# Patient Record
Sex: MALE | Race: BLACK OR AFRICAN AMERICAN | Employment: FULL TIME | URBAN - METROPOLITAN AREA
[De-identification: names, ages, dates, MRNs, and addresses within clinical notes are randomized per-mention and may not be internally consistent; named-entity substitution may affect disease eponyms.]

---

## 2018-05-15 ENCOUNTER — OFFICE VISIT (OUTPATIENT)
Dept: FAMILY MEDICINE CLINIC | Age: 28
End: 2018-05-15

## 2018-05-15 ENCOUNTER — TELEPHONE (OUTPATIENT)
Dept: FAMILY MEDICINE CLINIC | Age: 28
End: 2018-05-15

## 2018-05-15 VITALS
BODY MASS INDEX: 28.25 KG/M2 | DIASTOLIC BLOOD PRESSURE: 88 MMHG | WEIGHT: 175.8 LBS | TEMPERATURE: 98.7 F | SYSTOLIC BLOOD PRESSURE: 119 MMHG | RESPIRATION RATE: 16 BRPM | HEIGHT: 66 IN | OXYGEN SATURATION: 100 % | HEART RATE: 64 BPM

## 2018-05-15 DIAGNOSIS — N04.9 NEPHROTIC SYNDROME: ICD-10-CM

## 2018-05-15 DIAGNOSIS — R10.9 ACUTE LEFT FLANK PAIN: Primary | ICD-10-CM

## 2018-05-15 DIAGNOSIS — R11.15 NON-INTRACTABLE CYCLICAL VOMITING WITH NAUSEA: ICD-10-CM

## 2018-05-15 LAB
BILIRUB UR QL STRIP: NORMAL
GLUCOSE UR-MCNC: NEGATIVE MG/DL
KETONES P FAST UR STRIP-MCNC: NORMAL MG/DL
PH UR STRIP: 5.5 [PH] (ref 4.6–8)
PROT UR QL STRIP: NORMAL
SP GR UR STRIP: 1.03 (ref 1–1.03)
UA UROBILINOGEN AMB POC: NORMAL (ref 0.2–1)
URINALYSIS CLARITY POC: CLEAR
URINALYSIS COLOR POC: NORMAL
URINE BLOOD POC: NORMAL
URINE LEUKOCYTES POC: NEGATIVE
URINE NITRITES POC: NEGATIVE

## 2018-05-15 RX ORDER — ONDANSETRON 8 MG/1
8 TABLET, ORALLY DISINTEGRATING ORAL
Qty: 20 TAB | Refills: 0 | Status: SHIPPED | OUTPATIENT
Start: 2018-05-15 | End: 2018-07-02 | Stop reason: ALTCHOICE

## 2018-05-15 RX ORDER — FUROSEMIDE 20 MG/1
20 TABLET ORAL 2 TIMES DAILY
COMMUNITY
End: 2019-05-29 | Stop reason: ALTCHOICE

## 2018-05-15 RX ORDER — TRAMADOL HYDROCHLORIDE 50 MG/1
50 TABLET ORAL
Qty: 15 TAB | Refills: 0 | Status: SHIPPED | OUTPATIENT
Start: 2018-05-15 | End: 2018-07-02 | Stop reason: ALTCHOICE

## 2018-05-15 RX ORDER — LISINOPRIL 5 MG/1
TABLET ORAL DAILY
Status: ON HOLD | COMMUNITY
End: 2020-01-16

## 2018-05-15 RX ORDER — DICYCLOMINE HYDROCHLORIDE 20 MG/1
20 TABLET ORAL EVERY 6 HOURS
COMMUNITY
End: 2018-08-14 | Stop reason: ALTCHOICE

## 2018-05-15 RX ORDER — ONDANSETRON 8 MG/1
8 TABLET, ORALLY DISINTEGRATING ORAL
COMMUNITY
End: 2018-05-15 | Stop reason: SDUPTHER

## 2018-05-15 RX ORDER — FAMOTIDINE 20 MG/1
20 TABLET, FILM COATED ORAL 2 TIMES DAILY
COMMUNITY
End: 2018-08-14 | Stop reason: ALTCHOICE

## 2018-05-15 NOTE — PATIENT INSTRUCTIONS
Flank Pain: Care Instructions  Your Care Instructions  Flank pain is pain on the side of the back just below the rib cage and above the waist. It can be on one or both sides. Flank pain has many possible causes, including a kidney stone, a urinary tract infection, or back strain. Flank pain may get better on its own. But don't ignore new symptoms, such as fever, nausea and vomiting, urination problems, pain that gets worse, and dizziness. These may be signs of a more serious problem. You may have to have tests or other treatment. Follow-up care is a key part of your treatment and safety. Be sure to make and go to all appointments, and call your doctor if you are having problems. It's also a good idea to know your test results and keep a list of the medicines you take. How can you care for yourself at home? · Rest until you feel better. · Take pain medicines exactly as directed. ¨ If the doctor gave you a prescription medicine for pain, take it as prescribed. ¨ If you are not taking a prescription pain medicine, ask your doctor if you can take an over-the-counter pain medicine, such as acetaminophen (Tylenol), ibuprofen (Advil, Motrin), or naproxen (Aleve). Read and follow all instructions on the label. · If your doctor prescribed antibiotics, take them as directed. Do not stop taking them just because you feel better. You need to take the full course of antibiotics. · To apply heat, put a warm water bottle, a heating pad set on low, or a warm cloth on the painful area. Do not go to sleep with a heating pad on your skin. · To prevent dehydration, drink plenty of fluids, enough so that your urine is light yellow or clear like water. Choose water and other caffeine-free clear liquids until you feel better. If you have kidney, heart, or liver disease and have to limit fluids, talk with your doctor before you increase the amount of fluids you drink. When should you call for help?   Call your doctor now or seek immediate medical care if:  ? · Your flank pain gets worse. ? · You have new symptoms, such as fever, nausea, or vomiting. ? · You have symptoms of a urinary problem. For example:  ¨ You have blood or pus in your urine. ¨ You have chills or body aches. ¨ It hurts to urinate. ¨ You have groin or belly pain. ? Watch closely for changes in your health, and be sure to contact your doctor if you do not get better as expected. Where can you learn more? Go to http://deb-otis.info/. Enter S191 in the search box to learn more about \"Flank Pain: Care Instructions. \"  Current as of: March 20, 2017  Content Version: 11.4  © 4129-3921 [employer]. Care instructions adapted under license by [employer] (which disclaims liability or warranty for this information). If you have questions about a medical condition or this instruction, always ask your healthcare professional. [place] [address] any warranty or liability for your use of this information. Flank Pain: Care Instructions  Your Care Instructions  Flank pain is pain on the side of the back just below the rib cage and above the waist. It can be on one or both sides. Flank pain has many possible causes, including a kidney stone, a urinary tract infection, or back strain. Flank pain may get better on its own. But don't ignore new symptoms, such as fever, nausea and vomiting, urination problems, pain that gets worse, and dizziness. These may be signs of a more serious problem. You may have to have tests or other treatment. Follow-up care is a key part of your treatment and safety. Be sure to make and go to all appointments, and call your doctor if you are having problems. It's also a good idea to know your test results and keep a list of the medicines you take. How can you care for yourself at home? · Rest until you feel better. · Take pain medicines exactly as directed.   ¨ If the doctor gave you a prescription medicine for pain, take it as prescribed. ¨ If you are not taking a prescription pain medicine, ask your doctor if you can take an over-the-counter pain medicine, such as acetaminophen (Tylenol), ibuprofen (Advil, Motrin), or naproxen (Aleve). Read and follow all instructions on the label. · If your doctor prescribed antibiotics, take them as directed. Do not stop taking them just because you feel better. You need to take the full course of antibiotics. · To apply heat, put a warm water bottle, a heating pad set on low, or a warm cloth on the painful area. Do not go to sleep with a heating pad on your skin. · To prevent dehydration, drink plenty of fluids, enough so that your urine is light yellow or clear like water. Choose water and other caffeine-free clear liquids until you feel better. If you have kidney, heart, or liver disease and have to limit fluids, talk with your doctor before you increase the amount of fluids you drink. When should you call for help? Call your doctor now or seek immediate medical care if:  ? · Your flank pain gets worse. ? · You have new symptoms, such as fever, nausea, or vomiting. ? · You have symptoms of a urinary problem. For example:  ¨ You have blood or pus in your urine. ¨ You have chills or body aches. ¨ It hurts to urinate. ¨ You have groin or belly pain. ? Watch closely for changes in your health, and be sure to contact your doctor if you do not get better as expected. Where can you learn more? Go to http://deb-otis.info/. Enter S191 in the search box to learn more about \"Flank Pain: Care Instructions. \"  Current as of: March 20, 2017  Content Version: 11.4  © 9798-7391 [employer]. Care instructions adapted under license by [employer] (which disclaims liability or warranty for this information).  If you have questions about a medical condition or this instruction, always ask your healthcare professional. [address] any warranty or liability for your use of this information.

## 2018-05-15 NOTE — PROGRESS NOTES
Chief Complaint   Patient presents with    Side Pain     and vomiting x  1 day     1. Have you been to the ER, urgent care clinic since your last visit? Hospitalized since your last visit? Yes When: 5/8/2018 Where: [facility]  Reason for visit: food poisioning    2. Have you seen or consulted any other health care providers outside of the [address] [place] [address] since your last visit? Include any pap smears or colon screening.  Yes When: 2/2018 Where:  [facility] Reason for visit: kidney problems

## 2018-05-15 NOTE — MR AVS SNAPSHOT
Deven Ca [facility] [address] [facility] [address] [facility] [phone] [id number] [phone] Patient: Loretta Bay MRN: [id number] TMM:7/17/8693 Visit Information Date & Time Provider Department Dept. Phone Encounter #  
 5/15/2018 11:30 AM Yaima Montemayor NP [facility] 13 [id number] Follow-up Instructions Return in about 2 days (around 5/17/2018) for flank pain. Upcoming Health Maintenance Date Due DTaP/Tdap/Td series (1 - Tdap) 2/24/2010 Influenza Age 5 to Adult 8/1/2018 Allergies as of 5/15/2018  Review Complete On: 5/15/2018 By: Kermit Fothergill, LPN No Known Allergies Current Immunizations  Never Reviewed No immunizations on file. Not reviewed this visit You Were Diagnosed With   
  
 Codes Comments Acute left flank pain    -  Primary ICD-10-CM: R10.9 ICD-9-CM: 789.09, 338.19 Non-intractable cyclical vomiting with nausea     ICD-10-CM: G43. A0 ICD-9-CM: 970. 2 Vitals BP Pulse Temp Resp Height(growth percentile) Weight(growth percentile) 119/88 (BP 1 Location: Left arm, BP Patient Position: Sitting) 64 98.7 °F (37.1 °C) (Oral) 16 5' 6\" (1.676 m) 175 lb 12.8 oz (79.7 kg) SpO2 BMI Smoking Status 100% 28.37 kg/m2 Current Every Day Smoker Vitals History BMI and BSA Data Body Mass Index Body Surface Area  
 28.37 kg/m 2 1.93 m 2 Preferred Pharmacy Pharmacy Name Phone [phone] Mel[address] Rylie DUDLEY [place] [phone] Your Updated Medication List  
  
   
This list is accurate as of 5/15/18 12:27 PM.  Always use your most recent med list.  
  
  
  
  
 BENTYL 20 mg tablet Generic drug:  dicyclomine Take 20 mg by mouth every six (6) hours. furosemide 20 mg tablet Commonly known as:  LASIX Take 20 mg by mouth two (2) times a day. lisinopril 5 mg tablet Commonly known as:  Valene Pencil  
 Take  by mouth daily. ondansetron 8 mg disintegrating tablet Commonly known as:  ZOFRAN ODT Take 1 Tab by mouth every eight (8) hours as needed for Nausea. PEPCID 20 mg tablet Generic drug:  famotidine Take 20 mg by mouth two (2) times a day. traMADol 50 mg tablet Commonly known as:  ULTRAM  
Take 1 Tab by mouth every eight (8) hours as needed for Pain. Max Daily Amount: 150 mg.  
  
  
  
  
Prescriptions Printed Refills  
 traMADol (ULTRAM) 50 mg tablet 0 Sig: Take 1 Tab by mouth every eight (8) hours as needed for Pain. Max Daily Amount: 150 mg.  
 Class: Print Route: Oral  
  
Prescriptions Sent to Pharmacy Refills  
 ondansetron (ZOFRAN ODT) 8 mg disintegrating tablet 0 Sig: Take 1 Tab by mouth every eight (8) hours as needed for Nausea. Class: Normal  
 Pharmacy: [facility] DR DIANELYS IGLESIAS [address] [place] [address] Ph #: [phone] Route: Oral  
  
We Performed the Following AMB POC URINALYSIS DIP STICK AUTO W/O MICRO [24956 CPT(R)] Follow-up Instructions Return in about 2 days (around 5/17/2018) for flank pain. To-Do List   
 05/15/2018 Imaging:  CT ABD PELV W WO CONT Patient Instructions Flank Pain: Care Instructions Your Care Instructions Flank pain is pain on the side of the back just below the rib cage and above the waist. It can be on one or both sides. Flank pain has many possible causes, including a kidney stone, a urinary tract infection, or back strain. Flank pain may get better on its own. But don't ignore new symptoms, such as fever, nausea and vomiting, urination problems, pain that gets worse, and dizziness. These may be signs of a more serious problem. You may have to have tests or other treatment. Follow-up care is a key part of your treatment and safety.  Be sure to make and go to all appointments, and call your doctor if you are having problems. It's also a good idea to know your test results and keep a list of the medicines you take. How can you care for yourself at home? · Rest until you feel better. · Take pain medicines exactly as directed. ¨ If the doctor gave you a prescription medicine for pain, take it as prescribed. ¨ If you are not taking a prescription pain medicine, ask your doctor if you can take an over-the-counter pain medicine, such as acetaminophen (Tylenol), ibuprofen (Advil, Motrin), or naproxen (Aleve). Read and follow all instructions on the label. · If your doctor prescribed antibiotics, take them as directed. Do not stop taking them just because you feel better. You need to take the full course of antibiotics. · To apply heat, put a warm water bottle, a heating pad set on low, or a warm cloth on the painful area. Do not go to sleep with a heating pad on your skin. · To prevent dehydration, drink plenty of fluids, enough so that your urine is light yellow or clear like water. Choose water and other caffeine-free clear liquids until you feel better. If you have kidney, heart, or liver disease and have to limit fluids, talk with your doctor before you increase the amount of fluids you drink. When should you call for help? Call your doctor now or seek immediate medical care if: 
? · Your flank pain gets worse. ? · You have new symptoms, such as fever, nausea, or vomiting. ? · You have symptoms of a urinary problem. For example: ¨ You have blood or pus in your urine. ¨ You have chills or body aches. ¨ It hurts to urinate. ¨ You have groin or belly pain. ? Watch closely for changes in your health, and be sure to contact your doctor if you do not get better as expected. Where can you learn more? Go to http://deb-otis.info/. Enter S191 in the search box to learn more about \"Flank Pain: Care Instructions. \" Current as of: March 20, 2017 Content Version: 11.4 © 6249-0749 Tap[employer]. Care instructions adapted under license by [employer] (which disclaims liability or warranty for this information). If you have questions about a medical condition or this instruction, always ask your healthcare professional. [address] any warranty or liability for your use of this information. Flank Pain: Care Instructions Your Care Instructions Flank pain is pain on the side of the back just below the rib cage and above the waist. It can be on one or both sides. Flank pain has many possible causes, including a kidney stone, a urinary tract infection, or back strain. Flank pain may get better on its own. But don't ignore new symptoms, such as fever, nausea and vomiting, urination problems, pain that gets worse, and dizziness. These may be signs of a more serious problem. You may have to have tests or other treatment. Follow-up care is a key part of your treatment and safety. Be sure to make and go to all appointments, and call your doctor if you are having problems. It's also a good idea to know your test results and keep a list of the medicines you take. How can you care for yourself at home? · Rest until you feel better. · Take pain medicines exactly as directed. ¨ If the doctor gave you a prescription medicine for pain, take it as prescribed. ¨ If you are not taking a prescription pain medicine, ask your doctor if you can take an over-the-counter pain medicine, such as acetaminophen (Tylenol), ibuprofen (Advil, Motrin), or naproxen (Aleve). Read and follow all instructions on the label. · If your doctor prescribed antibiotics, take them as directed. Do not stop taking them just because you feel better. You need to take the full course of antibiotics. · To apply heat, put a warm water bottle, a heating pad set on low, or a warm cloth on the painful area. Do not go to sleep with a heating pad on your skin. · To prevent dehydration, drink plenty of fluids, enough so that your urine is light yellow or clear like water. Choose water and other caffeine-free clear liquids until you feel better. If you have kidney, heart, or liver disease and have to limit fluids, talk with your doctor before you increase the amount of fluids you drink. When should you call for help? Call your doctor now or seek immediate medical care if: 
? · Your flank pain gets worse. ? · You have new symptoms, such as fever, nausea, or vomiting. ? · You have symptoms of a urinary problem. For example: ¨ You have blood or pus in your urine. ¨ You have chills or body aches. ¨ It hurts to urinate. ¨ You have groin or belly pain. ? Watch closely for changes in your health, and be sure to contact your doctor if you do not get better as expected. Where can you learn more? Go to http://deb-otis.info/. Enter S191 in the search box to learn more about \"Flank Pain: Care Instructions. \" Current as of: March 20, 2017 Content Version: 11.4 © 9226-1502 Oyokey. Care instructions adapted under license by [employer] (which disclaims liability or warranty for this information). If you have questions about a medical condition or this instruction, always ask your healthcare professional. [address] any warranty or liability for your use of this information. Introducing [facility]! [employer] introduces [employer] patient portal. Now you can access parts of your medical record, email your doctor's office, and request medication refills online. 1. In your internet browser, go to [email or web address] [place] 2. Click on the First Time User? Click Here link in the Sign In box. You will see the New Member Sign Up page. 3. Enter your [facility] [facility] Access Code exactly as it appears below.  You will not need to use this code after youve completed the sign-up process. If you do not sign up before the expiration date, you must request a new code. · siXis Access Code: [email or web address] Expires: 8/13/2018 12:27 PM 
 
4. Enter the last four digits of your Social Security Number (xxxx) and Date of Birth (mm/dd/yyyy) as indicated and click Submit. You will be taken to the next sign-up page. 5. Create a siXis ID. This will be your siXis login ID and cannot be changed, so think of one that is secure and easy to remember. 6. Create a siXis password. You can change your password at any time. 7. Enter your Password Reset Question and Answer. This can be used at a later time if you forget your password. 8. Enter your e-mail address. You will receive e-mail notification when new information is available in [address]. 9. Click Sign Up. You can now view and download portions of your medical record. 10. Click the Download Summary menu link to download a portable copy of your medical information. If you have questions, please visit the Frequently Asked Questions section of the siXis website. Remember, siXis is NOT to be used for urgent needs. For medical emergencies, dial 911. Now available from your iPhone and Android! Please provide this summary of care documentation to your next provider. If you have any questions after today's visit, please call [phone].

## 2018-05-15 NOTE — TELEPHONE ENCOUNTER
Gillian Aguirre called and wanted to know what the provider was looking for, for the CT. She states the order does not specify and they need to know so they can know what how to run the CT. After speaking with provider Sahil Greene NP, she is looking for hydronephrosis.

## 2018-05-15 NOTE — PROGRESS NOTES
Chief Complaint   Patient presents with    Side Pain     and vomiting x  1 day       HPI:    This is a  32 y/o  male patient who presents in copy of his mother for left flank pain, nausea and vomiting for one day. Patient confirm he was seen at the ED on 5/8/18 for food poisoning after eating hamburger. He was treated and discharged home and have been feeling better since up until yesterday when he started to vomit with left flank pain. Describes pain as a sharp pain and rates it 10/10. No aggravating or relieving factor. Have been vomiting every hour and unable to keep food down. Patient states he is concerned about his Kidney- he was seen at [facility] ED in November for lower extremity swelling. He was diagnosed with nephrotic syndrome and started on lasix. Recent ED visit labs reviewed:  Kidney function normal      Results for orders placed or performed in visit on 05/15/18   AMB POC URINALYSIS DIP STICK AUTO W/O MICRO   Result Value Ref Range    Color (UA POC) Dark Danay     Clarity (UA POC) Clear     Glucose (UA POC) Negative Negative    Bilirubin (UA POC) 1+ Negative    Ketones (UA POC) Trace Negative    Specific gravity (UA POC) 1.030 1.001 - 1.035    Blood (UA POC) 3+ Negative    pH (UA POC) 5.5 4.6 - 8.0    Protein (UA POC) 3+ Negative    Urobilinogen (UA POC) 1 mg/dL 0.2 - 1    Nitrites (UA POC) Negative Negative    Leukocyte esterase (UA POC) Negative Negative         ROS: Pt denies: Wt loss, Fever/Chills, HA, Visual changes, Fatigue, Chest pain, SOB, GONSALVES, D/C, Blood in stool or urine   Pertinent positive as above in HPI. All others were negative          Past Medical History:   Diagnosis Date    Chronic kidney disease        History reviewed. No pertinent surgical history.        Social History     Social History    Marital status: UNKNOWN     Spouse name: N/A    Number of children: N/A    Years of education: N/A     Social History Main Topics    Smoking status: Current Every Day Smoker     Packs/day: 1.00    Smokeless tobacco: Never Used    Alcohol use Yes      Comment: 2 beers a month    Drug use: No    Sexual activity: No     Other Topics Concern    None     Social History Narrative    None       Current Outpatient Prescriptions   Medication Sig Dispense Refill    dicyclomine (BENTYL) 20 mg tablet Take 20 mg by mouth every six (6) hours.  furosemide (LASIX) 20 mg tablet Take 20 mg by mouth two (2) times a day.  famotidine (PEPCID) 20 mg tablet Take 20 mg by mouth two (2) times a day.  lisinopril (PRINIVIL, ZESTRIL) 5 mg tablet Take  by mouth daily.  traMADol (ULTRAM) 50 mg tablet Take 1 Tab by mouth every eight (8) hours as needed for Pain. Max Daily Amount: 150 mg. 15 Tab 0    ondansetron (ZOFRAN ODT) 8 mg disintegrating tablet Take 1 Tab by mouth every eight (8) hours as needed for Nausea. 20 Tab 0       No Known Allergies          Physical Exam:    Vital Signs:   Visit Vitals    /88 (BP 1 Location: Left arm, BP Patient Position: Sitting)    Pulse 64    Temp 98.7 °F (37.1 °C) (Oral)    Resp 16    Ht 5' 6\" (1.676 m)    Wt 175 lb 12.8 oz (79.7 kg)    SpO2 100%    BMI 28.37 kg/m2         General: a, a & o x 3, afebrile, interacting appropriately, in no acute distress  HEENT: head normocephalic and atraumatic, conjuctiva clear  Skin: warm and dry, no rashes , no bruises, no skin lesions  Neck: supple, symmetrical, no palpable mass, no thyromegaly  Respiratory: lung sounds clear bilaterally, , no wheezes or crackles  Cardiovascular: normal S1S2, regular rate and rhythm, no murmurs  Abdomen: non-distended, normal bowel sounds x 4  Musculoskeletal: normal ROM on all joints, no swelling or deformity  Psychiatric: a, a & o x 3, appropriate mood and affect, no thought disorder  Extremities: 2+ pitting edema to BLE        Assessment/Plan:      ICD-10-CM ICD-9-CM    1.  Acute left flank pain R10.9 789.09 traMADol (ULTRAM) 50 mg tablet     338.19 AMB POC URINALYSIS DIP STICK AUTO W/O MICRO-  3+  Proetin      CT ABD PELV W WO CONT      CANCELED: CT ABD PELV W WO CONT   2. Non-intractable cyclical vomiting with nausea G43. A0 536.2 ondansetron (ZOFRAN ODT) 8 mg disintegrating tablet           Additional Notes: Discussed today's diagnosis, treatment plans. Discussed medication indications and side effects. After Visit Summary: Provided and discussed printed patient instructions. Answered questions in relation to today's diagnosis.   Follow-up Disposition: 2 days f/u            Bj Perez NP-BC  Family Medicine  [facility]              Orders Placed This Encounter    CT ABD PELV W WO CONT    AMB POC URINALYSIS DIP STICK AUTO W/O MICRO    dicyclomine (BENTYL) 20 mg tablet    furosemide (LASIX) 20 mg tablet    famotidine (PEPCID) 20 mg tablet    lisinopril (PRINIVIL, ZESTRIL) 5 mg tablet    DISCONTD: ondansetron (ZOFRAN ODT) 8 mg disintegrating tablet    traMADol (ULTRAM) 50 mg tablet    ondansetron (ZOFRAN ODT) 8 mg disintegrating tablet

## 2018-05-17 ENCOUNTER — OFFICE VISIT (OUTPATIENT)
Dept: FAMILY MEDICINE CLINIC | Age: 28
End: 2018-05-17

## 2018-05-17 VITALS
WEIGHT: 175 LBS | DIASTOLIC BLOOD PRESSURE: 76 MMHG | BODY MASS INDEX: 28.12 KG/M2 | TEMPERATURE: 98.2 F | HEART RATE: 65 BPM | HEIGHT: 66 IN | RESPIRATION RATE: 16 BRPM | SYSTOLIC BLOOD PRESSURE: 120 MMHG

## 2018-05-17 DIAGNOSIS — R10.9 FLANK PAIN: ICD-10-CM

## 2018-05-17 DIAGNOSIS — N05.9 GLOMERULONEPHRITIS: ICD-10-CM

## 2018-05-17 DIAGNOSIS — K52.9 GASTROENTERITIS: Primary | ICD-10-CM

## 2018-05-17 NOTE — PROGRESS NOTES
Patient stated that Bentyl made him vomit and he stopped medication. Also patient stated he was not taking the Lisinopril dur to it made him have a headache.

## 2018-05-17 NOTE — PATIENT INSTRUCTIONS
Glomerulonephritis: Care Instructions  Your Care Instructions    Glomerulonephritis (say \"ngvx-vbkz-und-bow-ugr-UWY-tus\") is inflammation of the part of the kidney that filters blood. This part is called the glomerulus. Kidney problems can cause swelling in the face, belly, arms, hands, legs, or feet. This problem can be caused by an infection or some medicines. It can also be caused by diseases such as diabetes or lupus. Sometimes the cause is not known. This illness may get better with treatment. But it often leads to long-term (chronic) kidney disease. Treatment may include:  · Corticosteroid medicines. These reduce inflammation. · Immunosuppressive medicines. These reduce inflammation. · One or more medicines to lower your blood pressure. · Dialysis, in some cases. In this treatment, a machine does the work of the kidneys to clean wastes from the blood. Follow-up care is a key part of your treatment and safety. Be sure to make and go to all appointments, and call your doctor if you are having problems. It's also a good idea to know your test results and keep a list of the medicines you take. How can you care for yourself at home? · Take your medicines exactly as prescribed. Call your doctor if you have any problems with your medicine. You will get more details on the specific medicines your doctor prescribes. · Talk to a registered dietitian to help you make a meal plan that is right for you. Most people with chronic kidney disease need to limit salt (sodium), fluids, and protein. Some also have to limit potassium and phosphorus. · Do not take anti-inflammatory medicines such as ibuprofen (Advil, Motrin) and naproxen (Aleve). They can make kidney problems worse. It is okay to take acetaminophen (Tylenol). · Seek support from family, friends, and a counselor if you need it. Long-term illnesses can be difficult and stressful. When should you call for help?   Call 911 anytime you think you may need emergency care. For example, call if:  ? · You passed out (lost consciousness). ?Call your doctor now or seek immediate medical care if:  ? · You have new or worse nausea and vomiting. ? · You have much less urine than normal, or you have no urine. ? · You are feeling confused or cannot think clearly. ? · You have new or more blood in your urine. ? · You have new swelling. ? · You are dizzy or lightheaded, or you feel like you may faint. ? Watch closely for changes in your health, and be sure to contact your doctor if:  ? · You do not get better as expected. Where can you learn more? Go to http://deb-otis.info/. Enter H062 in the search box to learn more about \"Glomerulonephritis: Care Instructions. \"  Current as of: May 12, 2017  Content Version: 11.4  © 3034-0429 [facility]. Care instructions adapted under license by [employer] (which disclaims liability or warranty for this information). If you have questions about a medical condition or this instruction, always ask your healthcare professional. [address] any warranty or liability for your use of this information.

## 2018-05-17 NOTE — MR AVS SNAPSHOT
Deven James [place] [address] [facility] [address] [facility] 83 [address] 
[phone] Patient: Nighat Cuevas MRN: [id number] VJS:7/87/[id number] Visit Information Date & Time Provider Department Dept. Phone Encounter #  
 5/17/2018  7:30 AM Cecily Lee, [address] [place], [place] [id number] Follow-up Instructions Return if symptoms worsen or fail to improve. Upcoming Health Maintenance Date Due Pneumococcal 19-64 Highest Risk (1 of 3 - PCV13) 2/24/2008 DTaP/Tdap/Td series (1 - Tdap) 2/24/2010 Influenza Age 5 to Adult 8/1/2018 Allergies as of 5/17/2018  Review Complete On: 5/17/2018 By: Dakota Brink LPN No Known Allergies Current Immunizations  Never Reviewed No immunizations on file. Not reviewed this visit You Were Diagnosed With   
  
 Codes Comments Gastroenteritis    -  Primary ICD-10-CM: K52.9 ICD-9-CM: 558. 9 Flank pain     ICD-10-CM: R10.9 ICD-9-CM: 789.09 Vitals BP Pulse Temp Resp Height(growth percentile) Weight(growth percentile) 120/76 65 98.2 °F (36.8 °C) (Oral) 16 5' 6\" (1.676 m) 175 lb (79.4 kg) BMI Smoking Status 28.25 kg/m2 Current Every Day Smoker Vitals History BMI and BSA Data Body Mass Index Body Surface Area  
 28.25 kg/m 2 1.92 m 2 Preferred Pharmacy Pharmacy Name Phone [facility] [address] [place] [address] [phone] Your Updated Medication List  
  
   
This list is accurate as of 5/17/18  8:09 AM.  Always use your most recent med list.  
  
  
  
  
 BENTYL 20 mg tablet Generic drug:  dicyclomine Take 20 mg by mouth every six (6) hours. furosemide 20 mg tablet Commonly known as:  LASIX Take 20 mg by mouth two (2) times a day. lisinopril 5 mg tablet Commonly known as:  Maza [place] Take  by mouth daily. ondansetron 8 mg disintegrating tablet Commonly known as:  ZOFRAN ODT Take 1 Tab by mouth every eight (8) hours as needed for Nausea. PEPCID 20 mg tablet Generic drug:  famotidine Take 20 mg by mouth two (2) times a day. traMADol 50 mg tablet Commonly known as:  ULTRAM  
Take 1 Tab by mouth every eight (8) hours as needed for Pain. Max Daily Amount: 150 mg. Follow-up Instructions Return if symptoms worsen or fail to improve. Patient Instructions Glomerulonephritis: Care Instructions Your Care Instructions Glomerulonephritis (say \"nmew-baiz-lrb-cse-xqc-NSG-tus\") is inflammation of the part of the kidney that filters blood. This part is called the glomerulus. Kidney problems can cause swelling in the face, belly, arms, hands, legs, or feet. This problem can be caused by an infection or some medicines. It can also be caused by diseases such as diabetes or lupus. Sometimes the cause is not known. This illness may get better with treatment. But it often leads to long-term (chronic) kidney disease. Treatment may include: · Corticosteroid medicines. These reduce inflammation. · Immunosuppressive medicines. These reduce inflammation. · One or more medicines to lower your blood pressure. · Dialysis, in some cases. In this treatment, a machine does the work of the kidneys to clean wastes from the blood. Follow-up care is a key part of your treatment and safety. Be sure to make and go to all appointments, and call your doctor if you are having problems. It's also a good idea to know your test results and keep a list of the medicines you take. How can you care for yourself at home? · Take your medicines exactly as prescribed. Call your doctor if you have any problems with your medicine. You will get more details on the specific medicines your doctor prescribes.  
· Talk to a registered dietitian to help you make a meal plan that is right for you. Most people with chronic kidney disease need to limit salt (sodium), fluids, and protein. Some also have to limit potassium and phosphorus. · Do not take anti-inflammatory medicines such as ibuprofen (Advil, Motrin) and naproxen (Aleve). They can make kidney problems worse. It is okay to take acetaminophen (Tylenol). · Seek support from family, friends, and a counselor if you need it. Long-term illnesses can be difficult and stressful. When should you call for help? Call 911 anytime you think you may need emergency care. For example, call if: 
? · You passed out (lost consciousness). ?Call your doctor now or seek immediate medical care if: 
? · You have new or worse nausea and vomiting. ? · You have much less urine than normal, or you have no urine. ? · You are feeling confused or cannot think clearly. ? · You have new or more blood in your urine. ? · You have new swelling. ? · You are dizzy or lightheaded, or you feel like you may faint. ? Watch closely for changes in your health, and be sure to contact your doctor if: 
? · You do not get better as expected. Where can you learn more? Go to http://deb-otis.info/. Enter X827 in the search box to learn more about \"Glomerulonephritis: Care Instructions. \" Current as of: May 12, 2017 Content Version: 11.4 © 2462-1001 [employer]. Care instructions adapted under license by [employer] (which disclaims liability or warranty for this information). If you have questions about a medical condition or this instruction, always ask your healthcare professional. [address] any warranty or liability for your use of this information. Introducing [facility] & HEALTH SERVICES! Wilmer Hdz introduces [employer] patient portal. Now you can access parts of your medical record, email your doctor's office, and request medication refills online.    
 
1. In your internet browser, go to [email or web address] 2. Click on the First Time User? Click Here link in the Sign In box. You will see the New Member Sign Up page. 3. Enter your [employer] Access Code exactly as it appears below. You will not need to use this code after youve completed the sign-up process. If you do not sign up before the expiration date, you must request a new code. · [employer] Access Code: [email or web address] Expires: 8/13/2018 12:27 PM 
 
4. Enter the last four digits of your Social Security Number (xxxx) and Date of Birth (mm/dd/yyyy) as indicated and click Submit. You will be taken to the next sign-up page. 5. Create a AlmondNett ID. This will be your [employer] login ID and cannot be changed, so think of one that is secure and easy to remember. 6. Create a [employer] password. You can change your password at any time. 7. Enter your Password Reset Question and Answer. This can be used at a later time if you forget your password. 8. Enter your e-mail address. You will receive e-mail notification when new information is available in [address] 9. Click Sign Up. You can now view and download portions of your medical record. 10. Click the Download Summary menu link to download a portable copy of your medical information. If you have questions, please visit the Frequently Asked Questions section of the [employer] website. Remember, [employer] is NOT to be used for urgent needs. For medical emergencies, dial 911. Now available from your iPhone and Android! Please provide this summary of care documentation to your next provider. If you have any questions after today's visit, please call [phone].

## 2018-05-17 NOTE — PROGRESS NOTES
Chief Complaint   Patient presents with    Follow-up     2 day for flank pain       HPI:    This is a  32 y/o  male patient who presents for left flank pain, N/V and  Review of CT. Patient states he feel much better- flank pain, N/V resolved  Bilateral leg swelling improved- have been taking lasix as prescribed. He confirm he has appointment with nephrology next month- he is unable to recall name. Recent CT abd/pelv reviewed with patient- unremarkable. IMPRESSION: 1. Small volume of free fluid in the pelvis probably related to nephrotic syndrome. 2. Unremarkable kidneys with no evidence for suspicious calcifications. Ureters are nondilated. Bladder distended normally. 3. No evidence for hematoma of the retroperitoneum post renal biopsy since 2/2/2018. Or      ROS: Pertinent positive as above in HPI. All others were negative          Past Medical History:   Diagnosis Date    Chronic kidney disease          Social History     Social History    Marital status: UNKNOWN     Spouse name: N/A    Number of children: N/A    Years of education: N/A     Social History Main Topics    Smoking status: Current Every Day Smoker     Packs/day: 1.00    Smokeless tobacco: Never Used    Alcohol use Yes      Comment: 2 beers a month    Drug use: No    Sexual activity: No     Other Topics Concern    None     Social History Narrative       Current Outpatient Prescriptions   Medication Sig Dispense Refill    furosemide (LASIX) 20 mg tablet Take 20 mg by mouth two (2) times a day.  famotidine (PEPCID) 20 mg tablet Take 20 mg by mouth two (2) times a day.  traMADol (ULTRAM) 50 mg tablet Take 1 Tab by mouth every eight (8) hours as needed for Pain. Max Daily Amount: 150 mg. 15 Tab 0    ondansetron (ZOFRAN ODT) 8 mg disintegrating tablet Take 1 Tab by mouth every eight (8) hours as needed for Nausea. 20 Tab 0    dicyclomine (BENTYL) 20 mg tablet Take 20 mg by mouth every six (6) hours.       lisinopril (PRINIVIL, ZESTRIL) 5 mg tablet Take  by mouth daily. No Known Allergies          Physical Exam:    Vital Signs:   Visit Vitals    /76    Pulse 65    Temp 98.2 °F (36.8 °C) (Oral)    Resp 16    Ht 5' 6\" (1.676 m)    Wt 175 lb (79.4 kg)    BMI 28.25 kg/m2         General: a, a & o x 3, afebrile, interacting appropriately, in no acute distress  HEENT: head normocephalic and atraumatic, conjuctiva clear  Respiratory: lung sounds clear bilaterally, , no wheezes or crackles  Cardiovascular: normal S1S2, regular rate and rhythm, no murmurs  Abdomen: non-distended, normal bowel sounds x 4  Extremities: 1+ pitting edema to BLE        Assessment/Plan:        ICD-10-CM ICD-9-CM    1. Gastroenteritis K52.9 558.9 Resolved   2. Flank pain R10.9 789.09 Resolved   3. Glomerulonephritis N05.9 583.9 Follow by nephrology  Patient advised to keep appointment next month  Avoid NSAID  Take medications as prescribed           Additional Notes: Discussed today's diagnosis, treatment plans. Discussed medication indications and side effects. After Visit Summary: Provided and discussed printed patient instructions. Answered questions in relation to today's diagnosis.   Follow-up Disposition:  As needed            HUSAM Kingston  [address] [place] [address],[address]

## 2018-07-02 ENCOUNTER — OFFICE VISIT (OUTPATIENT)
Dept: FAMILY MEDICINE CLINIC | Age: 28
End: 2018-07-02

## 2018-07-02 VITALS
BODY MASS INDEX: 29.89 KG/M2 | SYSTOLIC BLOOD PRESSURE: 109 MMHG | RESPIRATION RATE: 18 BRPM | HEIGHT: 66 IN | DIASTOLIC BLOOD PRESSURE: 69 MMHG | TEMPERATURE: 98.3 F | HEART RATE: 79 BPM | WEIGHT: 186 LBS | OXYGEN SATURATION: 98 %

## 2018-07-02 DIAGNOSIS — Z00.00 ROUTINE GENERAL MEDICAL EXAMINATION AT A HEALTH CARE FACILITY: Primary | ICD-10-CM

## 2018-07-02 NOTE — PROGRESS NOTES
1. Have you been to the ER, urgent care clinic since your last visit? Hospitalized since your last visit? No.     2. Have you seen or consulted any other health care providers outside of the [address] [place] [address] since your last visit? Include any pap smears or colon screening. Yes, saw his Nephrologist in 2/2018.      Chief Complaint   Patient presents with    Complete Physical

## 2018-07-02 NOTE — MR AVS SNAPSHOT
Deven Ca [facility] [address] [facility] [address] [facility] 83 [address] 
[phone] Patient: Shelley Ruby MRN: [id number]:[id number]/[id number] Visit Information Date & Time Provider Department Dept. Phone Encounter #  
 7/2/2018 12:30 PM Toi Lopez [id number] Follow-up Instructions Return in about 1 year (around 7/2/2019) for Routine physical.  
  
Upcoming Health Maintenance Date Due Pneumococcal 19-64 Highest Risk (1 of 3 - PCV13) 5/16/2019* DTaP/Tdap/Td series (1 - Tdap) 5/16/2019* Influenza Age 5 to Adult 8/1/2018 *Topic was postponed. The date shown is not the original due date. Allergies as of 7/2/2018  Review Complete On: 7/2/2018 By: Hipolito Quinn No Known Allergies Current Immunizations  Never Reviewed No immunizations on file. Not reviewed this visit You Were Diagnosed With   
  
 Codes Comments Routine general medical examination at a health care facility    -  Primary ICD-10-CM: Z00.00 ICD-9-CM: V70.0 Vitals BP Pulse Temp Resp Height(growth percentile) Weight(growth percentile) 109/69 79 98.3 °F (36.8 °C) (Oral) 18 5' 6\" (1.676 m) 186 lb (84.4 kg) SpO2 BMI Smoking Status 98% 30.02 kg/m2 Current Every Day Smoker Vitals History BMI and BSA Data Body Mass Index Body Surface Area 30.02 kg/m 2 1.98 m 2 Preferred Pharmacy Pharmacy Name Phone [address] [facility] [phone] Your Updated Medication List  
  
   
This list is accurate as of 7/2/18  1:00 PM.  Always use your most recent med list.  
  
  
  
  
 BENTYL 20 mg tablet Generic drug:  dicyclomine Take 20 mg by mouth every six (6) hours. furosemide 20 mg tablet Commonly known as:  LASIX Take 20 mg by mouth two (2) times a day. lisinopril 5 mg tablet Commonly known as:  Thersia Kubas Take  by mouth daily. PEPCID 20 mg tablet Generic drug:  famotidine Take 20 mg by mouth two (2) times a day. Follow-up Instructions Return in about 1 year (around 7/2/2019) for Routine physical.  
  
  
Introducing [facility] & [facility]! Laurel Feliz introduces [employer] patient portal. Now you can access parts of your medical record, email your doctor's office, and request medication refills online. 1. In your internet browser, go to [email or web address] 2. Click on the First Time User? Click Here link in the Sign In box. You will see the New Member Sign Up page. 3. Enter your [employer] Access Code exactly as it appears below. You will not need to use this code after youve completed the sign-up process. If you do not sign up before the expiration date, you must request a new code. · [employer] Access Code: [email or web address] Expires: 8/13/2018 12:27 PM 
 
4. Enter the last four digits of your Social Security Number (xxxx) and Date of Birth (mm/dd/yyyy) as indicated and click Submit. You will be taken to the next sign-up page. 5. Create a [employer] ID. This will be your [employer] login ID and cannot be changed, so think of one that is secure and easy to remember. 6. Create a [employer] password. You can change your password at any time. 7. Enter your Password Reset Question and Answer. This can be used at a later time if you forget your password. 8. Enter your e-mail address. You will receive e-mail notification when new information is available in [address] 9. Click Sign Up. You can now view and download portions of your medical record. 10. Click the Download Summary menu link to download a portable copy of your medical information. If you have questions, please visit the Frequently Asked Questions section of the [employer] website. Remember, [employer] is NOT to be used for urgent needs. For medical emergencies, dial 911. Now available from your iPhone and Android! Please provide this summary of care documentation to your next provider. If you have any questions after today's visit, please call [phone].

## 2018-07-02 NOTE — PROGRESS NOTES
Chief Complaint   Patient presents with    Complete Physical     HPI:    This is a 32 y/o male  patient who presents for complete annual physical.he wants pertinent labs and screening test for age     see nephrology tomorrow    Last seen a long time     had lab done today     recently see at [address] [facility]- 2 weeks ago- leg swollen    Missed 4 appt      [place]    [place] [facility]    ROS: Pt denies: Wt loss, Fever/Chills, HA, Visual changes, Fatigue, Chest pain, SOB, GONSALVES, Abd pain, N/V/D/C, Blood in stool or urine, Edema. Pertinent positive as above in HPI. All others were negative          Past Medical History:   Diagnosis Date    Chronic kidney disease      Social History     Social History    Marital status: UNKNOWN     Spouse name: N/A    Number of children: N/A    Years of education: N/A     Occupational History    Not on file. Social History Main Topics    Smoking status: Current Every Day Smoker     Packs/day: 1.00    Smokeless tobacco: Never Used    Alcohol use Yes      Comment: 2 beers a month    Drug use: No    Sexual activity: No     Other Topics Concern    Not on file     Social History Narrative     Current Outpatient Prescriptions   Medication Sig Dispense Refill    furosemide (LASIX) 20 mg tablet Take 20 mg by mouth two (2) times a day.  dicyclomine (BENTYL) 20 mg tablet Take 20 mg by mouth every six (6) hours.  famotidine (PEPCID) 20 mg tablet Take 20 mg by mouth two (2) times a day.  lisinopril (PRINIVIL, ZESTRIL) 5 mg tablet Take  by mouth daily.  traMADol (ULTRAM) 50 mg tablet Take 1 Tab by mouth every eight (8) hours as needed for Pain. Max Daily Amount: 150 mg. 15 Tab 0    ondansetron (ZOFRAN ODT) 8 mg disintegrating tablet Take 1 Tab by mouth every eight (8) hours as needed for Nausea.  20 Tab 0     No Known Allergies    Physical Exam:    Visit Vitals    /69    Pulse 79    Temp 98.3 °F (36.8 °C) (Oral)    Resp 18    Ht 5' 6\" (1.676 m)    Wt 186 lb (84.4 kg)    SpO2 98%    BMI 30.02 kg/m2         General: a, a & o x 3, afebrile, well-nourished, interacting appropriately, in no acute distress  HEENT: head normocephalic and atraumatic, conjuctiva clear  Skin: warm and dry, no rashes , no bruises, no skin lesions  Neck: supple, symmetrical, no palpable mass, no thyromegaly, no carotid bruits, no JVD  Respiratory: lung sounds clear bilaterally, good air entry, good respiratory effort, no wheezes or crackles  Cardiovascular: normal S1S2, regular rate and rhythm, no murmurs  Abdomen: non-distended, normal bowel sounds x 4 quadrants, soft, non-tender to palpation  Musculoskeletal: normal ROM on all joints, no swelling or deformity  Psychiatric: a, a & o x 3, appropriate mood and affect, no thought disorder    Assessment/Plan:        ICD-10-CM ICD-9-CM    1. Routine general medical examination at a health care facility Z00.00 V70.0            Additional Notes: Discussed today's diagnosis, treatment plans. Discussed medication indications and side effects. After Visit Summary: Provided and discussed printed patient instructions. Answered questions in relation to today's diagnosis. Follow-up Disposition:        Elias Galloway, NP-BC  [address] [facility]        No orders of the defined types were placed in this encounter.

## 2018-08-14 ENCOUNTER — OFFICE VISIT (OUTPATIENT)
Dept: FAMILY MEDICINE CLINIC | Age: 28
End: 2018-08-14

## 2018-08-14 VITALS
SYSTOLIC BLOOD PRESSURE: 123 MMHG | HEIGHT: 66 IN | HEART RATE: 73 BPM | RESPIRATION RATE: 14 BRPM | OXYGEN SATURATION: 97 % | WEIGHT: 204.4 LBS | BODY MASS INDEX: 32.85 KG/M2 | TEMPERATURE: 97.2 F | DIASTOLIC BLOOD PRESSURE: 81 MMHG

## 2018-08-14 DIAGNOSIS — N02.2 MEMBRANOUS NEPHROPATHY DETERMINED BY BIOPSY: ICD-10-CM

## 2018-08-14 RX ORDER — ATORVASTATIN CALCIUM 20 MG/1
20 TABLET, FILM COATED ORAL DAILY
Qty: 1 TAB | Refills: 0
Start: 2018-08-14

## 2018-08-14 RX ORDER — ERGOCALCIFEROL 1.25 MG/1
50000 CAPSULE ORAL
COMMUNITY
Start: 2018-02-09

## 2018-08-14 NOTE — PROGRESS NOTES
Chief Complaint   Patient presents with   [address] [place] [address]     1. Have you been to the ER, urgent care clinic since your last visit? Hospitalized since your last visit? No    2. Have you seen or consulted any other health care providers outside of the [facility] since your last visit? Include any pap smears or colon screening. Yes When: Seen Nephrologist 2 weeks ago.     PHQ over the last two weeks 8/14/2018   Little interest or pleasure in doing things Not at all   Feeling down, depressed, irritable, or hopeless Not at all   Total Score PHQ 2 0     Visit Vitals    /81 (BP 1 Location: Left arm, BP Patient Position: Sitting)    Pulse 73    Temp 97.2 °F (36.2 °C) (Oral)    Resp 14    Ht 5' 6\" (1.676 m)    Wt 204 lb 6.4 oz (92.7 kg)    SpO2 97%    BMI 32.99 kg/m2

## 2018-08-14 NOTE — PROGRESS NOTES
Lisa Ordonez is a 33 y.o. male and presents with Establish Care       Subjective:    Nephrotic syndrome- seeing renal. Negative JOMAR/hep B/hep C. No meds prior to this developing. Swelling in legs goes up and down. Hyperlipidemia- taking statin- not sure of name or dose. But thinks it's lipitor at 20mg. Assessment/Plan:    Nephrotic syndrome- membranous nephropathy by bx- continue to f/u with renal. We discussed potential natural history and monitoring for thrombosis. Continue ACE stressed. Avoid salt stressed. On Tacrolimus also pt reports. Hyperlipidemia- continue statin (?atorvastatin 20mg)- pt will check on this    Pt asked to bring list of meds to all visit. RTC in 6 months. Orders Placed This Encounter    ergocalciferol (ERGOCALCIFEROL) 50,000 unit capsule     Si,000 Units.  atorvastatin (LIPITOR) 20 mg tablet     Sig: Take 1 Tab by mouth daily. Dispense:  1 Tab     Refill:  0         Diagnoses and all orders for this visit:    1. Membranous nephropathy determined by biopsy    Other orders  -     atorvastatin (LIPITOR) 20 mg tablet; Take 1 Tab by mouth daily. ROS:  Negative except as mentioned above  Cardiac- no chest pain or palpitations  Pulmonary- no sob or wheezes  GI- no n/v or diarrhea. SH:  Social History   Substance Use Topics    Smoking status: Current Every Day Smoker     Packs/day: 1.00    Smokeless tobacco: Never Used    Alcohol use Yes      Comment: 2 beers a month         Medications/Allergies:  Current Outpatient Prescriptions on File Prior to Visit   Medication Sig Dispense Refill    furosemide (LASIX) 20 mg tablet Take 20 mg by mouth two (2) times a day.  lisinopril (PRINIVIL, ZESTRIL) 5 mg tablet Take  by mouth daily. No current facility-administered medications on file prior to visit.            No Known Allergies    Objective:  Visit Vitals    /81 (BP 1 Location: Left arm, BP Patient Position: Sitting)    Pulse 73    Temp 97.2 °F (36.2 °C) (Oral)    Resp 14    Ht 5' 6\" (1.676 m)    Wt 204 lb 6.4 oz (92.7 kg)    SpO2 97%    BMI 32.99 kg/m2    Body mass index is 32.99 kg/(m^2). Constitutional: Well developed, nourished, no distress, alert   CV: S1, S2.  RRR. No murmurs/rubs. No thrills palpated. No carotid bruits. Intact distal pulses. No edema. Pulm: No abnormalities on inspection. Clear to auscultation bilaterally. No wheezing/rhonchi. Normal effort. GI: Soft, nontender, nondistended. Normal active bowel sounds. No  masses on palpation. No hepatosplenomegaly.

## 2018-08-14 NOTE — MR AVS SNAPSHOT
Deven James [place] [address] [place]. [id number] [phone] Patient: Carina Hernandez MRN: [id number] IGT:[id number] Visit Information Date & Time Provider Department Dept. Phone Encounter #  
 8/14/2018  4:00 PM Tammy Kerr, [address] [facility] [phone] [id number] Follow-up Instructions Return in about 6 months (around 2/14/2019) for 30 minute slot. Your Appointments 7/2/2019  7:30 AM  
PHYSICAL with DIMITRY [address] ([address]) Appt Note: eva Whitting[place]mojoe [place]. [phone] [facility]   
  
   
 [id number] Upcoming Health Maintenance Date Due Influenza Age 5 to Adult 8/1/2018 Pneumococcal 19-64 Highest Risk (1 of 3 - PCV13) 5/16/2019* DTaP/Tdap/Td series (1 - Tdap) 5/16/2019* *Topic was postponed. The date shown is not the original due date. Allergies as of 8/14/2018  Review Complete On: 8/14/2018 By: Lalo Luna LPN No Known Allergies Current Immunizations  Never Reviewed No immunizations on file. Not reviewed this visit You Were Diagnosed With   
  
 Codes Comments Membranous nephropathy determined by biopsy     ICD-10-CM: N02.2 ICD-9-CM: 583.1 Vitals BP Pulse Temp Resp Height(growth percentile) Weight(growth percentile) 123/81 (BP 1 Location: Left arm, BP Patient Position: Sitting) 73 97.2 °F (36.2 °C) (Oral) 14 5' 6\" (1.676 m) 204 lb 6.4 oz (92.7 kg) SpO2 BMI Smoking Status 97% 32.99 kg/m2 Current Every Day Smoker BMI and BSA Data Body Mass Index Body Surface Area  
 32.99 kg/m 2 2.08 m 2 Preferred Pharmacy Pharmacy Name Phone [facility] [address] [facility] [phone] Your Updated Medication List  
  
   
 This list is accurate as of 8/14/18  4:44 PM.  Always use your most recent med list.  
  
  
  
  
 atorvastatin 20 mg tablet Commonly known as:  LIPITOR Take 1 Tab by mouth daily. ergocalciferol 50,000 unit capsule Commonly known as:  ERGOCALCIFEROL  
50,000 Units. furosemide 20 mg tablet Commonly known as:  LASIX Take 20 mg by mouth two (2) times a day. lisinopril 5 mg tablet Commonly known as:  Alexandre Shutters Take  by mouth daily. Follow-up Instructions Return in about 6 months (around 2/14/2019) for 30 minute slot. Patient Instructions Please be sure to put a list of all your medications in your wallet. Nephrotic Syndrome: Care Instructions Your Care Instructions Healthy kidneys remove wastes from the blood. They also help balance water, salt, and mineral levels in the blood. Nephrotic syndrome is a sign that your kidneys aren't working right. When you have this kidney problem, you have high levels of protein in your urine. You may also have low levels of protein and high levels of cholesterol in your blood. The most common symptom is swelling around the eyes or in the feet or ankles. You may also notice foamy urine or weight gain from fluid buildup. The syndrome also increases the risk of skin infections. Diabetes is the most common cause of the syndrome. It can also be caused by kidney disease, lupus, some infections, and certain cancers. In some cases, the cause is not known. Which treatment you get depends on your age and what health problem is causing nephrotic syndrome. Your doctor might prescribe medicines. You might also need other treatments if the syndrome is causing other problems, such as high blood pressure or high cholesterol. Follow-up care is a key part of your treatment and safety.  Be sure to make and go to all appointments, and call your doctor if you are having problems. It's also a good idea to know your test results and keep a list of the medicines you take. How can you care for yourself? Work with your doctor · If your doctor prescribed medicines, take them as prescribed, even after you start to feel better. Call your doctor if you think you are having a problem with your medicine. · See your doctor regularly to have your kidney function checked. · Make sure your doctor knows about all the medicines, vitamins, or herbal supplements you take. This means anything you take with or without a prescription. · Get a flu shot each year. And get any other shots your doctor suggests. Care for yourself at home · Cut down on salt. This can reduce the amount of water your body retains. · Follow your doctor's advice for the amounts of protein and potassium you need in your diet. · Be gentle with your skin to prevent infection. Here are some tips: 
¨ Take short showers or baths using warm or cool water. Don't use hot water to bathe. ¨ Use mild soaps, such as Dove or Cetaphil. ¨ Pat your skin gently with towels to dry off after washing. ¨ Use moisturizing lotion after you bathe. You can use it more often if your skin is dry. Choose a lotion with no alcohol. ¨ Don't scratch or rub your skin. When should you call for help? Call 911 anytime you think you may need emergency care. For example, call if: 
  · You have sudden, severe pain in your belly.  
 Call your doctor now or seek immediate medical care if: 
  · You are gaining weight.  
  · You have trouble breathing.  
  · You have a fever.  
  · You have symptoms of a skin infection, such as: 
¨ Increased pain, swelling, warmth, or redness. ¨ Red streaks leading from the area. ¨ Pus draining from the area. ¨ A fever.  
 Watch closely for changes in your health, and be sure to contact your doctor if: 
  · You do not get better as expected. Where can you learn more? Go to http://deb-otis.info/. Enter M024 in the search box to learn more about \"Nephrotic Syndrome: Care Instructions. \" Current as of: May 12, 2017 Content Version: 11.7 © 0876-8140 [employer] Care instructions adapted under license by [employer] (acquired by [employer] in 2014) (which disclaims liability or warranty for this information). If you have questions about a medical condition or this instruction, always ask your healthcare professional. [address] any warranty or liability for your use of this information. Introducing [facility] & HEALTH SERVICES! Dear Niru Toro: Thank you for requesting a [employer] account. Our records indicate that you already have an active [employer] account. You can access your account anytime at [email or web address]. [employer] Did you know that you can access your hospital and ER discharge instructions at any time in [employer]? You can also review all of your test results from your hospital stay or ER visit. Additional Information If you have questions, please visit the Frequently Asked Questions section of the [employer] website at [email or web address] [email or web address]/. Remember, [employer] is NOT to be used for urgent needs. For medical emergencies, dial 911. Now available from your iPhone and Android! Please provide this summary of care documentation to your next provider. If you have any questions after today's visit, please call [phone].

## 2018-08-14 NOTE — PATIENT INSTRUCTIONS
Please be sure to put a list of all your medications in your wallet. Nephrotic Syndrome: Care Instructions  Your Care Instructions    Healthy kidneys remove wastes from the blood. They also help balance water, salt, and mineral levels in the blood. Nephrotic syndrome is a sign that your kidneys aren't working right. When you have this kidney problem, you have high levels of protein in your urine. You may also have low levels of protein and high levels of cholesterol in your blood. The most common symptom is swelling around the eyes or in the feet or ankles. You may also notice foamy urine or weight gain from fluid buildup. The syndrome also increases the risk of skin infections. Diabetes is the most common cause of the syndrome. It can also be caused by kidney disease, lupus, some infections, and certain cancers. In some cases, the cause is not known. Which treatment you get depends on your age and what health problem is causing nephrotic syndrome. Your doctor might prescribe medicines. You might also need other treatments if the syndrome is causing other problems, such as high blood pressure or high cholesterol. Follow-up care is a key part of your treatment and safety. Be sure to make and go to all appointments, and call your doctor if you are having problems. It's also a good idea to know your test results and keep a list of the medicines you take. How can you care for yourself? Work with your doctor  · If your doctor prescribed medicines, take them as prescribed, even after you start to feel better. Call your doctor if you think you are having a problem with your medicine. · See your doctor regularly to have your kidney function checked. · Make sure your doctor knows about all the medicines, vitamins, or herbal supplements you take. This means anything you take with or without a prescription. · Get a flu shot each year. And get any other shots your doctor suggests.   Care for yourself at home  · Cut down on salt. This can reduce the amount of water your body retains. · Follow your doctor's advice for the amounts of protein and potassium you need in your diet. · Be gentle with your skin to prevent infection. Here are some tips:  ¨ Take short showers or baths using warm or cool water. Don't use hot water to bathe. ¨ Use mild soaps, such as Dove or Cetaphil. ¨ Pat your skin gently with towels to dry off after washing. ¨ Use moisturizing lotion after you bathe. You can use it more often if your skin is dry. Choose a lotion with no alcohol. ¨ Don't scratch or rub your skin. When should you call for help? Call 911 anytime you think you may need emergency care. For example, call if:    · You have sudden, severe pain in your belly.    Call your doctor now or seek immediate medical care if:    · You are gaining weight.     · You have trouble breathing.     · You have a fever.     · You have symptoms of a skin infection, such as:  ¨ Increased pain, swelling, warmth, or redness. ¨ Red streaks leading from the area. ¨ Pus draining from the area. ¨ A fever.    Watch closely for changes in your health, and be sure to contact your doctor if:    · You do not get better as expected. Where can you learn more? Go to http://deb-otis.info/. Enter J213 in the search box to learn more about \"Nephrotic Syndrome: Care Instructions. \"  Current as of: May 12, 2017  Content Version: 11.7  © 8375-4206 [employer]. Care instructions adapted under license by [employer] (which disclaims liability or warranty for this information). If you have questions about a medical condition or this instruction, always ask your healthcare professional. [place] [address] any warranty or liability for your use of this information.

## 2018-08-16 DIAGNOSIS — R10.9 ACUTE LEFT FLANK PAIN: ICD-10-CM

## 2019-01-03 ENCOUNTER — DOCUMENTATION ONLY (OUTPATIENT)
Dept: FAMILY MEDICINE CLINIC | Age: 29
End: 2019-01-03

## 2019-01-03 NOTE — PROGRESS NOTES
Pt had PE- I spoke to Dr. Leonila Page- he was given Eliquis but cant afford it from the hospitalization. If he cannot get this or other NOAC, will need to start coumadin.    Please call him and add to schedule tomorrow afternoon, 1:30 or 2

## 2019-05-29 ENCOUNTER — OFFICE VISIT (OUTPATIENT)
Dept: FAMILY MEDICINE CLINIC | Age: 29
End: 2019-05-29

## 2019-05-29 VITALS
TEMPERATURE: 98 F | DIASTOLIC BLOOD PRESSURE: 74 MMHG | HEART RATE: 80 BPM | SYSTOLIC BLOOD PRESSURE: 115 MMHG | HEIGHT: 67 IN | BODY MASS INDEX: 29.82 KG/M2 | RESPIRATION RATE: 17 BRPM | WEIGHT: 190 LBS | OXYGEN SATURATION: 98 %

## 2019-05-29 DIAGNOSIS — R55 SYNCOPE, UNSPECIFIED SYNCOPE TYPE: Primary | ICD-10-CM

## 2019-05-29 DIAGNOSIS — E87.6 HYPOKALEMIA: ICD-10-CM

## 2019-05-29 RX ORDER — ASPIRIN 325 MG
325 TABLET ORAL DAILY
COMMUNITY

## 2019-05-29 RX ORDER — METOLAZONE 2.5 MG/1
2.5 TABLET ORAL DAILY
Refills: 6 | COMMUNITY
Start: 2019-04-05

## 2019-05-29 RX ORDER — POTASSIUM CHLORIDE 750 MG/1
40 TABLET, FILM COATED, EXTENDED RELEASE ORAL 2 TIMES DAILY
Status: ON HOLD | COMMUNITY
End: 2020-01-16

## 2019-05-29 NOTE — PATIENT INSTRUCTIONS
Vasovagal Syncope: Care Instructions Your Care Instructions Vasovagal syncope (say \"dcy-wqb-JQQEfraín JENNINGS-kuh-pee\")is sudden dizziness or fainting that can be set off by things such as pain, stress, fear, or trauma. You may sweat or feel lightheaded, sick to your stomach, or tingly. The problem causes the heart rate to slow and the blood vessels to widen, or dilate, for a short time. When this happens, blood pools in the lower body, and less blood goes to the brain. You can usually get relief by lying down with your legs raised (elevated). This helps more blood to flow to your brain and may help relieve symptoms like feeling dizzy. Some doctors may recommend a technique that involves tensing your fists and arms. This type of fainting is often easy to predict. For example, it happens to some people when they see blood or have to get a shot. They may feel symptoms before they faint. An episode of vasovagal syncope usually responds well to self-care. Other treatment often isn't needed. But if the fainting keeps happening, your doctor may suggest further treatments. Follow-up care is a key part of your treatment and safety. Be sure to make and go to all appointments, and call your doctor if you are having problems. It's also a good idea to know your test results and keep a list of the medicines you take. How can you care for yourself at home? · Drink plenty of fluids to prevent dehydration. If you have kidney, heart, or liver disease and have to limit fluids, talk with your doctor before you increase your fluid intake. · Try to avoid things that you think may set off vasovagal syncope. · Talk to your doctor about any medicines you take. Some medicines may increase the chance of this condition occurring. · If you feel symptoms, lie down with your legs raised. Talk to your doctor about what to do if your symptoms come back. When should you call for help? Call 911 anytime you think you may need emergency care. For example, call if: 
  · You have symptoms of a heart problem. These may include: 
? Chest pain or pressure. ? Severe trouble breathing. ? A fast or irregular heartbeat.  
 Watch closely for changes in your health, and be sure to contact your doctor if: 
  · You have more episodes of fainting at home.  
  · You do not get better as expected. Where can you learn more? Go to http://deb-otis.info/. Enter L754 in the search box to learn more about \"Vasovagal Syncope: Care Instructions. \" Current as of: September 23, 2018 Content Version: 11.9 © 9749-0272 [employer]. Care instructions adapted under license by [employer] (which disclaims liability or warranty for this information). If you have questions about a medical condition or this instruction, always ask your healthcare professional. [place] [address] any warranty or liability for your use of this information. Fainting: Care Instructions Your Care Instructions When you faint, or pass out, you lose consciousness for a short time. A brief drop in blood flow to the brain often causes it. When you fall or lie down, more blood flows to your brain and you regain consciousness. Emotional stress, pain, or overheatingespecially if you have been standingcan make you faint. In these cases, fainting is usually not serious. But fainting can be a sign of a more serious problem. Your doctor may want you to have more tests to rule out other causes. The treatment you need depends on the reason why you fainted. The doctor has checked you carefully, but problems can develop later. If you notice any problems or new symptoms, get medical treatment right away. Follow-up care is a key part of your treatment and safety.  Be sure to make and go to all appointments, and call your doctor if you are having problems. It's also a good idea to know your test results and keep a list of the medicines you take. How can you care for yourself at home? · Drink plenty of fluids to prevent dehydration. If you have kidney, heart, or liver disease and have to limit fluids, talk with your doctor before you increase your fluid intake. When should you call for help? Call 911 anytime you think you may need emergency care. For example, call if: 
  · You have symptoms of a heart problem. These may include: 
? Chest pain or pressure. ? Severe trouble breathing. ? A fast or irregular heartbeat. ? Lightheadedness or sudden weakness. ? Coughing up pink, foamy mucus. ? Passing out. After you call 911, the  may tell you to chew 1 adult-strength or 2 to 4 low-dose aspirin. Wait for an ambulance. Do not try to drive yourself.  
  · You have symptoms of a stroke. These may include: 
? Sudden numbness, tingling, weakness, or loss of movement in your face, arm, or leg, especially on only one side of your body. ? Sudden vision changes. ? Sudden trouble speaking. ? Sudden confusion or trouble understanding simple statements. ? Sudden problems with walking or balance. ? A sudden, severe headache that is different from past headaches.  
  · You passed out (lost consciousness) again.  
 Watch closely for changes in your health, and be sure to contact your doctor if: 
  · You do not get better as expected. Where can you learn more? Go to http://deb-otis.info/. Enter X622 in the search box to learn more about \"Fainting: Care Instructions. \" Current as of: September 23, 2018 Content Version: 11.9 © 8266-2139 [employer] Care instructions adapted under license by Where [employer] (which disclaims liability or warranty for this information).  If you have questions about a medical condition or this instruction, always ask your healthcare professional. Horatio Habermann[employer] disclaims any warranty or liability for your use of this information.
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
## 2019-08-19 ENCOUNTER — OFFICE VISIT (OUTPATIENT)
Dept: FAMILY MEDICINE CLINIC | Age: 29
End: 2019-08-19

## 2019-08-19 VITALS
RESPIRATION RATE: 16 BRPM | HEIGHT: 67 IN | OXYGEN SATURATION: 99 % | DIASTOLIC BLOOD PRESSURE: 97 MMHG | SYSTOLIC BLOOD PRESSURE: 147 MMHG | BODY MASS INDEX: 31.3 KG/M2 | TEMPERATURE: 98 F | HEART RATE: 72 BPM | WEIGHT: 199.4 LBS
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
## 2019-08-19 DIAGNOSIS — I10 ESSENTIAL HYPERTENSION: ICD-10-CM

## 2019-08-19 DIAGNOSIS — Z86.711 HISTORY OF PULMONARY EMBOLUS (PE): Primary | ICD-10-CM

## 2019-08-19 RX ORDER — TORSEMIDE 20 MG/1
20 TABLET ORAL 2 TIMES DAILY
Refills: 6 | COMMUNITY
Start: 2019-07-04

## 2019-08-19 NOTE — PATIENT INSTRUCTIONS
Fainting: Care Instructions  Your Care Instructions    When you faint, or pass out, you lose consciousness for a short time. A brief drop in blood flow to the brain often causes it. When you fall or lie down, more blood flows to your brain and you regain consciousness. Emotional stress, pain, or overheatingespecially if you have been standingcan make you faint. In these cases, fainting is usually not serious. But fainting can be a sign of a more serious problem. Your doctor may want you to have more tests to rule out other causes. The treatment you need depends on the reason why you fainted. The doctor has checked you carefully, but problems can develop later. If you notice any problems or new symptoms, get medical treatment right away. Follow-up care is a key part of your treatment and safety. Be sure to make and go to all appointments, and call your doctor if you are having problems. It's also a good idea to know your test results and keep a list of the medicines you take. How can you care for yourself at home? · Drink plenty of fluids to prevent dehydration. If you have kidney, heart, or liver disease and have to limit fluids, talk with your doctor before you increase your fluid intake. When should you call for help? Call 911 anytime you think you may need emergency care. For example, call if:    · You have symptoms of a heart problem. These may include:  ? Chest pain or pressure. ? Severe trouble breathing. ? A fast or irregular heartbeat. ? Lightheadedness or sudden weakness. ? Coughing up pink, foamy mucus. ? Passing out. After you call 911, the  may tell you to chew 1 adult-strength or 2 to 4 low-dose aspirin. Wait for an ambulance. Do not try to drive yourself.     · You have symptoms of a stroke. These may include:  ? Sudden numbness, tingling, weakness, or loss of movement in your face, arm, or leg, especially on only one side of your body. ? Sudden vision changes.   ? Sudden trouble speaking. ? Sudden confusion or trouble understanding simple statements. ? Sudden problems with walking or balance. ? A sudden, severe headache that is different from past headaches.     · You passed out (lost consciousness) again.    Watch closely for changes in your health, and be sure to contact your doctor if:    · You do not get better as expected. Where can you learn more? Go to http://deb-otis.info/. Enter B336 in the search box to learn more about \"Fainting: Care Instructions. \"  Current as of: September 23, 2018  Content Version: 12.1  © 7411-3023 [employer]. Care instructions adapted under license by [employer] (which disclaims liability or warranty for this information). If you have questions about a medical condition or this instruction, always ask your healthcare professional. [address] any warranty or liability for your use of this information.

## 2019-08-19 NOTE — PROGRESS NOTES
1. Have you been to the ER, urgent care clinic since your last visit? Hospitalized since your last visit? Yes, went to [facility] on 6/21/2019 for 5 days for sepsis and cellulitis on left leg. .     2. Have you seen or consulted any other health care providers outside of the [address] [place] [address] since your last visit? Include any pap smears or colon screening.  No.     Chief Complaint   Patient presents with    Form Completion     DMV
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
## 2019-10-21 ENCOUNTER — OFFICE VISIT (OUTPATIENT)
Dept: FAMILY MEDICINE CLINIC | Age: 29
End: 2019-10-21

## 2019-10-21 VITALS
RESPIRATION RATE: 16 BRPM | HEART RATE: 90 BPM | DIASTOLIC BLOOD PRESSURE: 100 MMHG | WEIGHT: 180.2 LBS | BODY MASS INDEX: 28.28 KG/M2 | SYSTOLIC BLOOD PRESSURE: 152 MMHG | HEIGHT: 67 IN | TEMPERATURE: 97.5 F

## 2019-10-21 DIAGNOSIS — N02.2 MEMBRANOUS NEPHROPATHY DETERMINED BY BIOPSY: Primary | ICD-10-CM

## 2019-10-21 DIAGNOSIS — L30.9 ECZEMA, UNSPECIFIED TYPE: ICD-10-CM

## 2019-10-21 RX ORDER — TRIAMCINOLONE ACETONIDE 1 MG/G
CREAM TOPICAL 2 TIMES DAILY
Qty: 30 G | Refills: 1 | Status: SHIPPED | OUTPATIENT
Start: 2019-10-21

## 2019-10-21 RX ORDER — METOPROLOL SUCCINATE 50 MG/1
TABLET, EXTENDED RELEASE ORAL
COMMUNITY
Start: 2019-10-07

## 2019-10-21 NOTE — PROGRESS NOTES
1. Have you been to the ER, urgent care clinic since your last visit? Hospitalized since your last visit? No.     2. Have you seen or consulted any other health care providers outside of the [address] [place] [address] since your last visit? Include any pap smears or colon screening. Yes, saw Dr. Ben Mcdaniel (nephro) on 10/8/2019. Chief Complaint   Patient presents with    Other     Scabbing right foot, itching, possible Athlete;s Foot.

## 2019-10-21 NOTE — PROGRESS NOTES
Saul Watson is a 26 y.o. male and presents with Other (Scabbing right foot, itching, possible Athlete;s Foot. )       Subjective:    Right foot itching and has been present for about 2 months. Membranous nephropathy-patient reports he just started dialysis and would like a second opinion on this. A/p:  Right foot dermatitis-most consistent with eczema. Will start Kenalog cream to use for up to 2 weeks at a time. If worsening or not resolving patient asked to call and will refer to dermatology. Membranous nephropathy-the patient would like a second opinion. Referral to nephrology placed. Diagnoses and all orders for this visit:    1. Membranous nephropathy determined by biopsy  -     REFERRAL TO NEPHROLOGY    2. Eczema, unspecified type    Other orders  -     triamcinolone acetonide (KENALOG) 0.1 % topical cream; Apply  to affected area two (2) times a day. use thin layer for 10-14 days- wait one month before replying. Orders Placed This Encounter    REFERRAL TO NEPHROLOGY     Referral Priority:   Routine     Referral Type:   Consultation     Referral Reason:   Specialty Services Required     Number of Visits Requested:   1    metoprolol succinate (TOPROL-XL) 50 mg XL tablet     Sig: metoprolol succinate ER 50 mg tablet,extended release 24 hr   TAKE 1 TABLET BY MOUTH ONCE DAILY    triamcinolone acetonide (KENALOG) 0.1 % topical cream     Sig: Apply  to affected area two (2) times a day. use thin layer for 10-14 days- wait one month before replying. Dispense:  30 g     Refill:  1     Discussed importance of follow-up with patient. He was asked to call in 1 week if he has not heard from his nephrology referral.  Follow-up in 3 months. ROS:  Negative except as mentioned above  Cardiac- no chest pain or palpitations  Pulmonary- no sob or wheezes  GI- no n/v or diarrhea.       SH:  Social History     Tobacco Use    Smoking status: Current Every Day Smoker     Packs/day: 1.00    Smokeless tobacco: Never Used   Substance Use Topics    Alcohol use: Yes     Comment: 2 beers a month    Drug use: No         Medications/Allergies:  Current Outpatient Medications on File Prior to Visit   Medication Sig Dispense Refill    metoprolol succinate (TOPROL-XL) 50 mg XL tablet metoprolol succinate ER 50 mg tablet,extended release 24 hr   TAKE 1 TABLET BY MOUTH ONCE DAILY      torsemide (DEMADEX) 20 mg tablet TAKE 1 TABLET BY MOUTH TWICE DAILY  6    potassium chloride SR (KLOR-CON 10) 10 mEq tablet Take 40 mEq by mouth two (2) times a day.  aspirin (ASPIRIN) 325 mg tablet Take 325 mg by mouth daily.  metOLazone (ZAROXOLYN) 2.5 mg tablet Take 2.5 mg by mouth daily. 6    methocarbamol (ROBAXIN) 500 mg tablet Take 2 Tabs by mouth four (4) times daily as needed for Pain. 12 Tab 0    potassium bicarbonate (KLYTE) 25 mEq tablet Take 1 Tab by mouth two (2) times a day. 4 Tab 0    ergocalciferol (ERGOCALCIFEROL) 50,000 unit capsule 50,000 Units every seven (7) days.  atorvastatin (LIPITOR) 20 mg tablet Take 1 Tab by mouth daily. 1 Tab 0    lisinopril (PRINIVIL, ZESTRIL) 5 mg tablet Take  by mouth daily. No current facility-administered medications on file prior to visit. No Known Allergies    Objective:  Visit Vitals  BP (!) 152/100   Pulse 90   Temp 97.5 °F (36.4 °C) (Oral)   Resp 16   Ht 5' 6.5\" (1.689 m)   Wt 180 lb 3.2 oz (81.7 kg)   BMI 28.65 kg/m²    Body mass index is 28.65 kg/m². Constitutional: Well developed, nourished, no distress, alert   skin  right foot with scaling lichenified plaque on dorsal foot. No maceration no significant changes between toes. No erythema. No discharge.

## 2020-01-16 PROBLEM — Z99.2 ESRD ON HEMODIALYSIS (HCC): Chronic | Status: ACTIVE | Noted: 2020-01-16

## 2020-01-16 PROBLEM — N18.6 ESRD ON HEMODIALYSIS (HCC): Chronic | Status: ACTIVE | Noted: 2020-01-16

## 2020-10-16 NOTE — LETTER
NOTIFICATION OF RETURN TO WORK / SCHOOL 
 
5/15/2018 Mr. Den Sargent [address] [facility] [address] [facility] [id number] To Whom It May Concern: 
 
Den Sargent was under the care of [facility] He will return to work on 5/18/18 with no restrictions. If there are questions or concerns please have the patient contact our office. Sincerely, Bob Rodriguez NP 

[phone]

## 2022-03-18 PROBLEM — N02.2 MEMBRANOUS NEPHROPATHY DETERMINED BY BIOPSY: Status: ACTIVE | Noted: 2018-08-14

## 2022-03-19 PROBLEM — N18.6 ESRD ON HEMODIALYSIS (HCC): Status: ACTIVE | Noted: 2020-01-16

## 2022-03-19 PROBLEM — Z99.2 ESRD ON HEMODIALYSIS (HCC): Status: ACTIVE | Noted: 2020-01-16

## 2022-03-19 PROBLEM — Z86.711 HISTORY OF PULMONARY EMBOLUS (PE): Status: ACTIVE | Noted: 2019-08-19

## 2022-03-20 PROBLEM — I10 ESSENTIAL HYPERTENSION: Status: ACTIVE | Noted: 2019-08-19
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
## 2023-01-31 RX ORDER — ERGOCALCIFEROL 1.25 MG/1
50000 CAPSULE ORAL
COMMUNITY
Start: 2018-02-09

## 2023-01-31 RX ORDER — METOPROLOL SUCCINATE 50 MG/1
TABLET, EXTENDED RELEASE ORAL
COMMUNITY
Start: 2019-10-07

## 2023-01-31 RX ORDER — ASPIRIN 325 MG
325 TABLET ORAL DAILY
COMMUNITY

## 2023-01-31 RX ORDER — ATORVASTATIN CALCIUM 20 MG/1
20 TABLET, FILM COATED ORAL DAILY
COMMUNITY
Start: 2018-08-14

## 2023-01-31 RX ORDER — TRIAMCINOLONE ACETONIDE 1 MG/G
CREAM TOPICAL 2 TIMES DAILY
COMMUNITY
Start: 2019-10-21

## 2023-01-31 RX ORDER — METOLAZONE 2.5 MG/1
2.5 TABLET ORAL DAILY
COMMUNITY
Start: 2019-04-05

## 2023-01-31 RX ORDER — TORSEMIDE 20 MG/1
20 TABLET ORAL 2 TIMES DAILY
COMMUNITY
Start: 2019-07-04

## 2023-01-31 RX ORDER — METHOCARBAMOL 500 MG/1
1000 TABLET, FILM COATED ORAL 4 TIMES DAILY PRN
COMMUNITY
Start: 2019-05-19

## 2023-01-31 RX ORDER — CARVEDILOL 12.5 MG/1
12.5 TABLET ORAL 2 TIMES DAILY WITH MEALS
COMMUNITY